# Patient Record
Sex: MALE | Race: BLACK OR AFRICAN AMERICAN | NOT HISPANIC OR LATINO | Employment: UNEMPLOYED | URBAN - METROPOLITAN AREA
[De-identification: names, ages, dates, MRNs, and addresses within clinical notes are randomized per-mention and may not be internally consistent; named-entity substitution may affect disease eponyms.]

---

## 2017-04-16 ENCOUNTER — HOSPITAL ENCOUNTER (EMERGENCY)
Facility: OTHER | Age: 62
Discharge: HOME | End: 2017-04-16
Attending: EMERGENCY MEDICINE
Payer: MEDICAID

## 2017-04-16 VITALS
TEMPERATURE: 98 F | SYSTOLIC BLOOD PRESSURE: 137 MMHG | HEART RATE: 70 BPM | BODY MASS INDEX: 29.8 KG/M2 | RESPIRATION RATE: 16 BRPM | DIASTOLIC BLOOD PRESSURE: 76 MMHG | OXYGEN SATURATION: 97 % | WEIGHT: 220 LBS | HEIGHT: 72 IN

## 2017-04-16 DIAGNOSIS — K14.8 TONGUE LESION: Primary | ICD-10-CM

## 2017-04-16 PROCEDURE — 99283 EMERGENCY DEPT VISIT LOW MDM: CPT

## 2017-04-16 RX ORDER — AMPICILLIN 250 MG/1
250 CAPSULE ORAL EVERY 6 HOURS
COMMUNITY

## 2017-04-16 NOTE — DISCHARGE INSTRUCTIONS
We have provided you with medication for pain. Please fill and take as directed.    Follow up with your dentist tomorrow as planned.    Please return to the ER if you have worsening symptoms, chest pain, difficulty breathing, fevers, altered mental status, dizziness, weakness, or any other concerns.

## 2017-04-16 NOTE — ED AVS SNAPSHOT
[facility]  [address]  [facility] [address]               Tyrese Laguna   2017  9:34 AM   ED    Description:  Male : 1955   Department:  [facility]           Your Care was Coordinated By:     Provider Role From To    Luz Marina Armendariz MD Attending Provider 17 0936 --      Reason for Visit     Mouth Lesions           Diagnoses this Visit        Comments    Tongue lesion    -  Primary       ED Disposition     None           To Do List           Follow-up Information     Follow up with Dentist In 1 day.       These Medications        Disp Refills Start End    diphenhydrAMINE-aluminum-magnesium hydroxide-simethicone-lidocaine HCl 2% 300 mL 0 2017     Swish and spit 15 mLs every 4 (four) hours as needed. - Swish & Spit      [facility]     [facility] Care Line -  Assistance  Unless otherwise directed by your provider, please contact [facility], our nurse care line that is available for  assistance.     Registered nurses in the [facility] provide: appointment scheduling, clinical advisement, health education, and other advisory services.  Call: [phone] (toll free)               Medications           Message regarding Medications     Verify the changes and/or additions to your medication regime listed below are the same as discussed with your clinician today.  If any of these changes or additions are incorrect, please notify your healthcare provider.        START taking these NEW medications        Refills    diphenhydrAMINE-aluminum-magnesium hydroxide-simethicone-lidocaine HCl 2% 0    Sig: Swish and spit 15 mLs every 4 (four) hours as needed.    Class: Print    Route: Swish & Spit           Verify that the below list of medications is an accurate representation of the medications you are currently taking.  If none reported, the list may be blank. If incorrect, please contact your  healthcare provider. Carry this list with you in case of emergency.           Current Medications     ampicillin (PRINCIPEN) 250 MG capsule Take 250 mg by mouth every 6 (six) hours.    diphenhydrAMINE-aluminum-magnesium hydroxide-simethicone-lidocaine HCl 2% Swish and spit 15 mLs every 4 (four) hours as needed.           Clinical Reference Information           Your Vitals Were     BP Pulse Temp Resp Height Weight    137/76 70 98 °F (36.7 °C) (Oral) 16 6' (1.829 m) 99.8 kg (220 lb)    SpO2 BMI             97% 29.84 kg/m2         Allergies as of 4/16/2017     No Known Allergies      Immunizations Administered on Date of Encounter - 4/16/2017     None      ED Micro, Lab, POCT     None      ED Imaging Orders     None        Discharge Instructions       We have provided you with medication for pain. Please fill and take as directed.    Follow up with your dentist tomorrow as planned.    Please return to the ER if you have worsening symptoms, chest pain, difficulty breathing, fevers, altered mental status, dizziness, weakness, or any other concerns.        [facility] Sign-Up     Activating your [facility] account is as easy as 1-2-3!     1) Visit [email or web address], select Sign Up Now, enter this activation code and your date of birth, then select Next.  [email or web address]  Expires: 5/31/2017  9:52 AM      2) Create a username and password to use when you visit [facility] in the future and select a security question in case you lose your password and select Next.    3) Enter your e-mail address and click Sign Up!    Additional Information  If you have questions, please e-mail [email or web address] or call [phone] to talk to our [facility] staff. Remember, [facility] is NOT to be used for urgent needs. For medical emergencies, dial 911.         Smoking Cessation     If you would like to quit smoking:   You may be eligible for free services if you are a [place] resident and started smoking cigarettes before September 1,  1988.  Call the [employer]oking [employer]essation [employer] (S[employer]) toll free at (448) 276-8864 or (095) 791-5071.   Call 1-800-QUIT-NOW if you do not meet the above criteria.   Contact us via email: [email or web address]   View our website for more information: [email or web address]         [facility] complies with applicable Federal civil rights laws and does not discriminate on the basis of race, color, national origin, age, disability, or sex.        Language Assistance Services     ATTENTION: Language assistance services are available, free of charge. Please call [phone].      ATENCIÓN: Si habla español, tiene a medel disposición servicios gratuitos de asistencia lingüística. Llame al [phone].     CHÚ Ý: N?u b?n nói Ti?ng Vi?t, có các d?ch v? h? tr? ngôn ng? mi?n phí dành cho b?n. G?i s? [phone].

## 2017-04-16 NOTE — ED PROVIDER NOTES
Encounter Date: 4/16/2017    SCRIBE #1 NOTE: Lary RAGLAND, am scribing for, and in the presence of, Dr. Armendariz.       History     Chief Complaint   Patient presents with    Mouth Lesions     hard, tender area to R side of post tongue x2 days     Review of patient's allergies indicates:  No Known Allergies  HPI Comments: Time seen by provider: 9:46 AM    This is a 61 y.o. male who presents with complaint of a mouth sore that has persisted for the past 3 days.  The patient claims that he has had a painful lesion to the right posterior aspect of his tongue.  He mentions no fever, chills, dental abnormalities, or facial swelling.  He states that his pain is exacerbated with the consumption of food and beverages.  The patient has not attempted to alleviate his discomfort with any OTC medication.  He states that he was seen by his dentist 5 days ago for a procedure and is currently taking ampicillin.  He reports no major medical problems or daily medications.  The patient states that he has a follow up appointment with his dentist tomorrow.     The history is provided by the patient.     No past medical history on file.  History reviewed. No pertinent surgical history.  History reviewed. No pertinent family history.  Social History   Substance Use Topics    Smoking status: Current Every Day Smoker     Types: Cigarettes    Smokeless tobacco: None    Alcohol use No     Review of Systems   Constitutional: Negative for chills and fever.   HENT: Positive for mouth sores. Negative for dental problem and facial swelling.    Gastrointestinal: Negative for nausea and vomiting.   Skin: Negative for rash and wound.       Physical Exam   Initial Vitals   BP Pulse Resp Temp SpO2   04/16/17 0933 04/16/17 0933 04/16/17 0933 04/16/17 0933 04/16/17 0933   137/76 70 16 98 °F (36.7 °C) 97 %     Physical Exam    Nursing note and vitals reviewed.  Constitutional: He appears well-developed and well-nourished. He is not diaphoretic. No  distress.   HENT:   Head: Normocephalic and atraumatic.   Right Ear: External ear normal.   Left Ear: External ear normal.   Mouth/Throat: Oral lesions present.   0.5 cm fluid-filled erythematous papule to the right posterior tongue that is tender to touch.    Eyes: Conjunctivae and EOM are normal. Right eye exhibits no discharge. Left eye exhibits no discharge.   Neck: Normal range of motion. Neck supple.   Pulmonary/Chest: No respiratory distress.   Musculoskeletal: Normal range of motion. He exhibits no edema or tenderness.   Neurological: He is alert and oriented to person, place, and time.   Skin: Skin is warm and dry. No rash and no abscess noted. No erythema. No pallor.   Psychiatric: He has a normal mood and affect. His behavior is normal. Judgment and thought content normal.         ED Course   Procedures  Labs Reviewed - No data to display   Imaging Results     None                  Medical Decision Making:   History:   Old Medical Records: I decided to obtain old medical records.  Initial Assessment:   9:46AM:  Pt is a 62 y/o M who presents to ED with posterior R tongue lesion.  Pt appears well, nontoxic.  The lesion is small, firm, seems fluid-filled, appears similar to a mucocele vs hemangioma, though difficult to assess as lesion is very posterior on the tongue.  Will plan for magic mouthwash.  He has a dental appointment tomorrow for follow up.  I counseled pt regarding supportive care measures.  I have discussed with the pt ED return warnings and need for close f/u.  Pt agreeable to plan and all questions answered.  I feel that pt is stable for discharge and management as an outpatient and no further intervention is needed at this time.  Pt is comfortable returning to the ED if needed.  Will DC home in stable condition.                Scribe Attestation:   Scribe #1: I performed the above scribed service and the documentation accurately describes the services I performed. I attest to the accuracy of  the note.    Attending Attestation:           Physician Attestation for Scribe:  Physician Attestation Statement for Scribe #1: I, Dr. Armendariz, reviewed documentation, as scribed by Lary Gonzalez in my presence, and it is both accurate and complete.                 ED Course     Clinical Impression:     1. Tongue lesion                Luz Marina Armendariz MD  04/16/17 1556

## 2017-04-16 NOTE — ED TRIAGE NOTES
Patient c/o feeling a bump to top right posterior side of mouth with pain and discomfort since Friday evening.  Patient stated that he has a dentist appointment this week but he doesn't think he can last that long. Patient currently on antibiotic for dental issues.

## 2019-12-11 ENCOUNTER — TELEPHONE (OUTPATIENT)
Dept: ENDOSCOPY | Facility: HOSPITAL | Age: 64
End: 2019-12-11

## 2019-12-11 NOTE — TELEPHONE ENCOUNTER
Referral for colonoscopy received from ,[facility]. Medical records scanned in chart under Media tab. Called patient to schedule procedure. Patient's number [phone] listed in chart is incorrect. Called [facility] number listed in chart and it's not working. Letter mailed regarding referral for colonoscopy with direct number to call back.

## 2019-12-31 ENCOUNTER — TELEPHONE (OUTPATIENT)
Dept: PODIATRY | Facility: CLINIC | Age: 64
End: 2019-12-31

## 2019-12-31 NOTE — TELEPHONE ENCOUNTER
Attempted to speak with patient regarding appointment rescheduling, Phone number listed is invalid.